# Patient Record
Sex: FEMALE | HISPANIC OR LATINO | Employment: FULL TIME | URBAN - METROPOLITAN AREA
[De-identification: names, ages, dates, MRNs, and addresses within clinical notes are randomized per-mention and may not be internally consistent; named-entity substitution may affect disease eponyms.]

---

## 2018-12-13 ENCOUNTER — HOSPITAL ENCOUNTER (OUTPATIENT)
Dept: RADIOLOGY | Facility: MEDICAL CENTER | Age: 48
End: 2018-12-13
Attending: SPECIALIST
Payer: COMMERCIAL

## 2018-12-13 DIAGNOSIS — C54.1 ENDOMETRIAL CANCER (HCC): ICD-10-CM

## 2018-12-13 PROCEDURE — 71260 CT THORAX DX C+: CPT

## 2018-12-13 PROCEDURE — 700117 HCHG RX CONTRAST REV CODE 255: Performed by: SPECIALIST

## 2018-12-13 RX ADMIN — IOHEXOL 100 ML: 350 INJECTION, SOLUTION INTRAVENOUS at 15:16

## 2018-12-13 RX ADMIN — IOHEXOL 50 ML: 240 INJECTION, SOLUTION INTRATHECAL; INTRAVASCULAR; INTRAVENOUS; ORAL at 15:16

## 2019-02-01 ENCOUNTER — HOSPITAL ENCOUNTER (OUTPATIENT)
Dept: HOSPITAL 8 - RAD | Age: 49
Discharge: HOME | End: 2019-02-01
Attending: RADIOLOGY
Payer: COMMERCIAL

## 2019-02-01 ENCOUNTER — HOSPITAL ENCOUNTER (OUTPATIENT)
Dept: HOSPITAL 8 - ROC | Age: 49
Discharge: HOME | End: 2019-02-01
Attending: RADIOLOGY
Payer: COMMERCIAL

## 2019-02-01 DIAGNOSIS — C54.1: ICD-10-CM

## 2019-02-01 DIAGNOSIS — K76.0: ICD-10-CM

## 2019-02-01 DIAGNOSIS — D73.4: ICD-10-CM

## 2019-02-01 DIAGNOSIS — K57.90: Primary | ICD-10-CM

## 2019-02-01 DIAGNOSIS — Z02.9: Primary | ICD-10-CM

## 2019-02-01 DIAGNOSIS — Z90.710: ICD-10-CM

## 2019-02-01 PROCEDURE — 74177 CT ABD & PELVIS W/CONTRAST: CPT

## 2019-02-01 PROCEDURE — 71260 CT THORAX DX C+: CPT

## 2019-03-04 ENCOUNTER — HOSPITAL ENCOUNTER (OUTPATIENT)
Dept: HOSPITAL 8 - CFH | Age: 49
Discharge: HOME | End: 2019-03-04
Attending: RADIOLOGY
Payer: COMMERCIAL

## 2019-03-04 DIAGNOSIS — C54.1: ICD-10-CM

## 2019-03-04 DIAGNOSIS — N60.02: Primary | ICD-10-CM

## 2019-03-04 DIAGNOSIS — Z15.01: ICD-10-CM

## 2019-03-04 PROCEDURE — 76641 ULTRASOUND BREAST COMPLETE: CPT

## 2019-03-04 PROCEDURE — G0279 TOMOSYNTHESIS, MAMMO: HCPCS

## 2019-03-04 PROCEDURE — 77066 DX MAMMO INCL CAD BI: CPT

## 2019-04-26 ENCOUNTER — HOSPITAL ENCOUNTER (OUTPATIENT)
Dept: HOSPITAL 8 - ROC | Age: 49
Discharge: HOME | End: 2019-04-26
Attending: RADIOLOGY
Payer: COMMERCIAL

## 2019-04-26 DIAGNOSIS — C54.1: Primary | ICD-10-CM

## 2019-04-26 PROCEDURE — G0463 HOSPITAL OUTPT CLINIC VISIT: HCPCS

## 2019-04-26 PROCEDURE — 99212 OFFICE O/P EST SF 10 MIN: CPT

## 2019-07-09 ENCOUNTER — HOSPITAL ENCOUNTER (OUTPATIENT)
Dept: HOSPITAL 8 - INFUSION | Age: 49
Discharge: HOME | End: 2019-07-09
Attending: SPECIALIST
Payer: COMMERCIAL

## 2019-07-09 VITALS — DIASTOLIC BLOOD PRESSURE: 90 MMHG | SYSTOLIC BLOOD PRESSURE: 156 MMHG

## 2019-07-09 VITALS — WEIGHT: 228.62 LBS | BODY MASS INDEX: 39.03 KG/M2 | HEIGHT: 64 IN

## 2019-07-09 DIAGNOSIS — Z51.11: Primary | ICD-10-CM

## 2019-07-09 DIAGNOSIS — C54.1: ICD-10-CM

## 2019-07-09 PROCEDURE — 96413 CHEMO IV INFUSION 1 HR: CPT

## 2019-07-09 PROCEDURE — 96375 TX/PRO/DX INJ NEW DRUG ADDON: CPT

## 2019-07-09 PROCEDURE — 96367 TX/PROPH/DG ADDL SEQ IV INF: CPT

## 2019-07-09 PROCEDURE — 96415 CHEMO IV INFUSION ADDL HR: CPT

## 2019-07-09 PROCEDURE — 96417 CHEMO IV INFUS EACH ADDL SEQ: CPT

## 2019-07-30 ENCOUNTER — HOSPITAL ENCOUNTER (OUTPATIENT)
Dept: HOSPITAL 8 - INFUSION | Age: 49
Discharge: HOME | End: 2019-07-30
Attending: SPECIALIST
Payer: COMMERCIAL

## 2019-07-30 VITALS — HEIGHT: 64 IN | BODY MASS INDEX: 39.03 KG/M2 | WEIGHT: 228.62 LBS

## 2019-07-30 VITALS — DIASTOLIC BLOOD PRESSURE: 82 MMHG | SYSTOLIC BLOOD PRESSURE: 121 MMHG

## 2019-07-30 DIAGNOSIS — Z85.89: ICD-10-CM

## 2019-07-30 DIAGNOSIS — Z51.11: Primary | ICD-10-CM

## 2019-07-30 DIAGNOSIS — E66.01: ICD-10-CM

## 2019-07-30 DIAGNOSIS — C54.1: ICD-10-CM

## 2019-07-30 LAB
<PLATELET ESTIMATE>: (no result)
<PLT MORPHOLOGY>: (no result)
ANISOCYTOSIS BLD QL SMEAR: (no result)
BASOPHILS # BLD AUTO: 0 X10^3/UL (ref 0–0.1)
BASOPHILS NFR BLD AUTO: 0 % (ref 0–1)
EOSINOPHIL # BLD AUTO: 0 X10^3/UL (ref 0–0.4)
EOSINOPHIL NFR BLD AUTO: 0 % (ref 1–7)
ERYTHROCYTE [DISTWIDTH] IN BLOOD BY AUTOMATED COUNT: 25.5 % (ref 9.6–15.2)
LYMPHOCYTES # BLD AUTO: 0.26 X10^3/UL (ref 1–3.4)
LYMPHOCYTES NFR BLD AUTO: 6 % (ref 22–44)
MCH RBC QN AUTO: 29.9 PG (ref 27–34.8)
MCHC RBC AUTO-ENTMCNC: 33.5 G/DL (ref 32.4–35.8)
MCV RBC AUTO: 89.4 FL (ref 80–100)
MD: (no result)
MONOCYTES # BLD AUTO: 0.03 X10^3/UL (ref 0.2–0.8)
MONOCYTES NFR BLD AUTO: 1 % (ref 2–9)
NEUTROPHILS # BLD AUTO: 3.85 X10^3/UL (ref 1.8–6.8)
NEUTROPHILS NFR BLD AUTO: 93 % (ref 42–75)
OVALOCYTES BLD QL SMEAR: (no result)
PLATELET # BLD AUTO: 101 X10^3/UL (ref 130–400)
PMV BLD AUTO: 8.1 FL (ref 7.4–10.4)
POLYCHROMASIA BLD QL SMEAR: (no result)
RBC # BLD AUTO: 3.23 X10^6/UL (ref 3.82–5.3)
TEAR DROPS: (no result)

## 2019-07-30 PROCEDURE — 96375 TX/PRO/DX INJ NEW DRUG ADDON: CPT

## 2019-07-30 PROCEDURE — 85025 COMPLETE CBC W/AUTO DIFF WBC: CPT

## 2019-07-30 PROCEDURE — 96417 CHEMO IV INFUS EACH ADDL SEQ: CPT

## 2019-07-30 PROCEDURE — 96367 TX/PROPH/DG ADDL SEQ IV INF: CPT

## 2019-07-30 PROCEDURE — 36415 COLL VENOUS BLD VENIPUNCTURE: CPT

## 2019-07-30 PROCEDURE — 96415 CHEMO IV INFUSION ADDL HR: CPT

## 2019-07-30 PROCEDURE — 96413 CHEMO IV INFUSION 1 HR: CPT

## 2019-08-20 ENCOUNTER — HOSPITAL ENCOUNTER (OUTPATIENT)
Dept: HOSPITAL 8 - INFUSION | Age: 49
Discharge: HOME | End: 2019-08-20
Attending: SPECIALIST
Payer: COMMERCIAL

## 2019-08-20 VITALS — BODY MASS INDEX: 39.37 KG/M2 | WEIGHT: 233.47 LBS | HEIGHT: 64.5 IN

## 2019-08-20 DIAGNOSIS — C54.1: Primary | ICD-10-CM

## 2019-08-20 PROCEDURE — 96417 CHEMO IV INFUS EACH ADDL SEQ: CPT

## 2019-08-20 PROCEDURE — 36591 DRAW BLOOD OFF VENOUS DEVICE: CPT

## 2019-08-20 PROCEDURE — 96368 THER/DIAG CONCURRENT INF: CPT

## 2019-08-20 PROCEDURE — 86304 IMMUNOASSAY TUMOR CA 125: CPT

## 2019-08-20 PROCEDURE — G0463 HOSPITAL OUTPT CLINIC VISIT: HCPCS

## 2019-08-20 PROCEDURE — 83735 ASSAY OF MAGNESIUM: CPT

## 2019-08-20 PROCEDURE — 96415 CHEMO IV INFUSION ADDL HR: CPT

## 2019-08-20 PROCEDURE — 99212 OFFICE O/P EST SF 10 MIN: CPT

## 2019-08-20 PROCEDURE — 96375 TX/PRO/DX INJ NEW DRUG ADDON: CPT

## 2019-08-20 PROCEDURE — 80053 COMPREHEN METABOLIC PANEL: CPT

## 2019-08-20 PROCEDURE — 96413 CHEMO IV INFUSION 1 HR: CPT

## 2019-08-20 PROCEDURE — 85025 COMPLETE CBC W/AUTO DIFF WBC: CPT

## 2019-08-20 PROCEDURE — 96367 TX/PROPH/DG ADDL SEQ IV INF: CPT

## 2019-08-20 PROCEDURE — 36415 COLL VENOUS BLD VENIPUNCTURE: CPT

## 2019-09-17 ENCOUNTER — HOSPITAL ENCOUNTER (OUTPATIENT)
Dept: RADIOLOGY | Facility: MEDICAL CENTER | Age: 49
End: 2019-09-17
Attending: NURSE PRACTITIONER
Payer: COMMERCIAL

## 2019-09-17 DIAGNOSIS — C54.1 MALIGNANT NEOPLASM OF ENDOMETRIUM (HCC): ICD-10-CM

## 2019-09-17 PROCEDURE — 74177 CT ABD & PELVIS W/CONTRAST: CPT

## 2019-09-17 PROCEDURE — 700117 HCHG RX CONTRAST REV CODE 255: Performed by: NURSE PRACTITIONER

## 2019-09-17 RX ADMIN — IOHEXOL 25 ML: 240 INJECTION, SOLUTION INTRATHECAL; INTRAVASCULAR; INTRAVENOUS; ORAL at 09:57

## 2019-09-17 RX ADMIN — IOHEXOL 100 ML: 350 INJECTION, SOLUTION INTRAVENOUS at 10:13

## 2019-10-07 NOTE — PROGRESS NOTES
"  Non face to face prolonged services of clinical data and chart information reviewed for a total time of 30 performed on 10/7 for Mandi Floresoza    Reviewed were:    Referral    Previous encounters    Medications    Imaging    Previous procedures    Other Orders    Letters    Notes    Media    Miscellaneous reports    Patient summaries        Counseling, testing information and materials prepared    \"I spent 30 minutes  from 0630 to 0700 reviewing past records, prior to visit pertaining to genetic risk.\"     Gera Kinsey M.D.  edited  "

## 2019-10-08 ENCOUNTER — OFFICE VISIT (OUTPATIENT)
Dept: HEMATOLOGY ONCOLOGY | Facility: MEDICAL CENTER | Age: 49
End: 2019-10-08
Payer: COMMERCIAL

## 2019-10-08 VITALS
SYSTOLIC BLOOD PRESSURE: 126 MMHG | WEIGHT: 235.56 LBS | DIASTOLIC BLOOD PRESSURE: 74 MMHG | TEMPERATURE: 97.7 F | BODY MASS INDEX: 40.22 KG/M2 | OXYGEN SATURATION: 98 % | RESPIRATION RATE: 14 BRPM | HEART RATE: 74 BPM | HEIGHT: 64 IN

## 2019-10-08 DIAGNOSIS — Z15.01 BRCA1 GENE MUTATION POSITIVE IN FEMALE: ICD-10-CM

## 2019-10-08 DIAGNOSIS — Z15.09 BRCA1 GENE MUTATION POSITIVE IN FEMALE: ICD-10-CM

## 2019-10-08 DIAGNOSIS — C54.1 ENDOMETRIAL CANCER (HCC): ICD-10-CM

## 2019-10-08 DIAGNOSIS — Z15.02 BRCA1 GENE MUTATION POSITIVE IN FEMALE: ICD-10-CM

## 2019-10-08 PROCEDURE — 99358 PROLONG SERVICE W/O CONTACT: CPT | Performed by: MEDICAL GENETICS

## 2019-10-08 PROCEDURE — 99203 OFFICE O/P NEW LOW 30 MIN: CPT | Performed by: MEDICAL GENETICS

## 2019-10-08 NOTE — PROGRESS NOTES
Patient referred for evaluation, counseling and management suggestions  The patient was diagnosed with endometrial cancer ~ 1 year ago and received genetic testing by Dr Olsen.  The patient was found to have a pathogenic mutation in the BRCA1 gene  ([id number]).  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    The BRCA1 pathogenic variant provides a 40-87% lifetime risk of breast cancer (in women) and 16-59% lifetime risk of ovarian, fallopian tube or peritoneal cancer in women  Males with the same BRCA1 variant have 1-2% lifetime risk of male breast cancer and 20% lifetime risk of prostate cancer  Both males and females with this BRCA1 variant have 1-3% lifetime risk of pancreatic cancers.    The patient's personal and family histories were reviewed in detail.  The patient has no other cancer diagnosis and, by report, has had an unremarkable mammogram recently    She has met with Dr Abad and has had a discussion of the value of risk reducing bilateral mastectomy  She is to be followed by Dr Abad with twice yearly alternating mammograms and MRI    She was surgically treated for her endometrial cancer by Dr Buckner who followed her with 6 months of a chemo regimen of unknown type.    A full and detailed pedigree was constructed (no Ashkenazic Mu-ism background).  The patient has 3 sons (no daughters), no grandchildren  The patient has 11 siblings (one brother : colon cancer); 7 living brothers and 3 sisters  One of the sisters carries a myeloma diagnosis  The son of a niece carries a leukemia diagnosis.    The remainder of the family history is limited due to little information    It was recommended that each of the patient's children be tested (the youngest at age 18-21) before reproduction.  It was also recommended that each sibling of the patient, with a 1 in 2 risk of BRCA1 carriage, should be tested  Testing nieces and nephews should be done based on the results of the patient's sibling test  results.    Also discussed was preimplantation genetic diagnosis and its utility in reproduction for this family     For family members who test positive: risk reducing surgical procedures and chemoprevention should be discussed    All questions answered    A total of 30 minutes of face to face discussion took place with >50% of which involving data gathering and management planning.

## 2019-10-11 ENCOUNTER — HOSPITAL ENCOUNTER (OUTPATIENT)
Dept: RADIOLOGY | Facility: MEDICAL CENTER | Age: 49
End: 2019-10-11
Attending: SPECIALIST
Payer: COMMERCIAL

## 2019-10-11 DIAGNOSIS — C54.1 ENDOMETRIAL NEOPLASM MALIGNANT (HCC): ICD-10-CM

## 2019-10-11 PROCEDURE — 76705 ECHO EXAM OF ABDOMEN: CPT

## 2019-12-06 ENCOUNTER — HOSPITAL ENCOUNTER (OUTPATIENT)
Dept: RADIOLOGY | Facility: MEDICAL CENTER | Age: 49
End: 2019-12-06
Attending: SPECIALIST
Payer: COMMERCIAL

## 2019-12-06 DIAGNOSIS — C54.1 ENDOMETRIAL SARCOMA (HCC): ICD-10-CM

## 2019-12-06 DIAGNOSIS — C55 MALIGNANT NEOPLASM OF UTERUS, UNSPECIFIED SITE (HCC): ICD-10-CM

## 2019-12-06 PROCEDURE — 700117 HCHG RX CONTRAST REV CODE 255: Performed by: SPECIALIST

## 2019-12-06 PROCEDURE — 71260 CT THORAX DX C+: CPT

## 2019-12-06 RX ADMIN — IOHEXOL 100 ML: 350 INJECTION, SOLUTION INTRAVENOUS at 11:15

## 2019-12-06 RX ADMIN — IOHEXOL 25 ML: 240 INJECTION, SOLUTION INTRATHECAL; INTRAVASCULAR; INTRAVENOUS; ORAL at 11:15

## 2020-03-05 ENCOUNTER — HOSPITAL ENCOUNTER (OUTPATIENT)
Dept: HOSPITAL 8 - CFH | Age: 50
Discharge: HOME | End: 2020-03-05
Attending: SURGERY
Payer: COMMERCIAL

## 2020-03-05 DIAGNOSIS — Z15.01: ICD-10-CM

## 2020-03-05 DIAGNOSIS — C54.1: ICD-10-CM

## 2020-03-05 DIAGNOSIS — Z12.31: Primary | ICD-10-CM

## 2020-03-05 PROCEDURE — 77067 SCR MAMMO BI INCL CAD: CPT

## 2020-03-05 PROCEDURE — 77063 BREAST TOMOSYNTHESIS BI: CPT

## 2020-06-19 ENCOUNTER — HOSPITAL ENCOUNTER (OUTPATIENT)
Dept: RADIOLOGY | Facility: MEDICAL CENTER | Age: 50
End: 2020-06-19
Attending: NURSE PRACTITIONER
Payer: COMMERCIAL

## 2020-06-19 DIAGNOSIS — C77.9 MALIGNANT NEOPLASM METASTATIC TO LYMPH NODES, UNSPECIFIED LYMPH NODE REGION (HCC): ICD-10-CM

## 2020-06-19 DIAGNOSIS — C54.1 ENDOMETRIAL SARCOMA (HCC): ICD-10-CM

## 2020-06-19 DIAGNOSIS — Z85.42 PERSONAL HISTORY OF MALIGNANT NEOPLASM OF OTHER PARTS OF UTERUS: ICD-10-CM

## 2020-06-19 PROCEDURE — 74177 CT ABD & PELVIS W/CONTRAST: CPT

## 2020-06-19 PROCEDURE — 700117 HCHG RX CONTRAST REV CODE 255: Performed by: NURSE PRACTITIONER

## 2020-06-19 RX ADMIN — IOHEXOL 25 ML: 240 INJECTION, SOLUTION INTRATHECAL; INTRAVASCULAR; INTRAVENOUS; ORAL at 12:15

## 2020-06-19 RX ADMIN — IOHEXOL 100 ML: 350 INJECTION, SOLUTION INTRAVENOUS at 12:15

## 2021-02-03 ENCOUNTER — HOSPITAL ENCOUNTER (OUTPATIENT)
Dept: HOSPITAL 8 - CFH | Age: 51
Discharge: HOME | End: 2021-02-03
Attending: SURGERY
Payer: COMMERCIAL

## 2021-02-03 DIAGNOSIS — Z15.01: ICD-10-CM

## 2021-02-03 DIAGNOSIS — N60.02: Primary | ICD-10-CM

## 2021-02-03 DIAGNOSIS — Z80.3: ICD-10-CM

## 2021-02-03 DIAGNOSIS — N60.01: ICD-10-CM

## 2021-02-03 PROCEDURE — A9575 INJ GADOTERATE MEGLUMI 0.1ML: HCPCS

## 2021-02-03 PROCEDURE — C8908 MRI W/O FOL W/CONT, BREAST,: HCPCS

## 2021-02-03 PROCEDURE — C8937 CAD BREAST MRI: HCPCS

## 2021-02-03 PROCEDURE — 77049 MRI BREAST C-+ W/CAD BI: CPT

## 2021-03-15 ENCOUNTER — HOSPITAL ENCOUNTER (OUTPATIENT)
Dept: LAB | Facility: MEDICAL CENTER | Age: 51
End: 2021-03-15
Attending: INTERNAL MEDICINE
Payer: COMMERCIAL

## 2021-03-15 ENCOUNTER — OFFICE VISIT (OUTPATIENT)
Dept: HEALTH INFORMATION MANAGEMENT | Facility: MEDICAL CENTER | Age: 51
End: 2021-03-15
Payer: COMMERCIAL

## 2021-03-15 VITALS
SYSTOLIC BLOOD PRESSURE: 126 MMHG | WEIGHT: 254.7 LBS | DIASTOLIC BLOOD PRESSURE: 82 MMHG | HEIGHT: 65 IN | OXYGEN SATURATION: 94 % | BODY MASS INDEX: 42.44 KG/M2 | HEART RATE: 71 BPM

## 2021-03-15 DIAGNOSIS — G47.9 SLEEPING DIFFICULTY: ICD-10-CM

## 2021-03-15 DIAGNOSIS — E78.00 HYPERCHOLESTEROLEMIA: ICD-10-CM

## 2021-03-15 DIAGNOSIS — Z15.01 BRCA1 POSITIVE: ICD-10-CM

## 2021-03-15 DIAGNOSIS — E55.9 VITAMIN D DEFICIENCY: ICD-10-CM

## 2021-03-15 DIAGNOSIS — R63.5 WEIGHT GAIN: ICD-10-CM

## 2021-03-15 DIAGNOSIS — E66.01 MORBID (SEVERE) OBESITY DUE TO EXCESS CALORIES (HCC): ICD-10-CM

## 2021-03-15 DIAGNOSIS — Z15.09 BRCA1 POSITIVE: ICD-10-CM

## 2021-03-15 DIAGNOSIS — E66.01 OBESITY, CLASS III, BMI 40-49.9 (MORBID OBESITY) (HCC): ICD-10-CM

## 2021-03-15 DIAGNOSIS — Z85.42 HISTORY OF ENDOMETRIAL CANCER: ICD-10-CM

## 2021-03-15 DIAGNOSIS — R73.01 IMPAIRED FASTING GLUCOSE: ICD-10-CM

## 2021-03-15 DIAGNOSIS — R63.2 BINGE EATING: ICD-10-CM

## 2021-03-15 LAB
ALBUMIN SERPL BCP-MCNC: 4.3 G/DL (ref 3.2–4.9)
ALBUMIN/GLOB SERPL: 1.3 G/DL
ALP SERPL-CCNC: 72 U/L (ref 30–99)
ALT SERPL-CCNC: 22 U/L (ref 2–50)
ANION GAP SERPL CALC-SCNC: 10 MMOL/L (ref 7–16)
AST SERPL-CCNC: 19 U/L (ref 12–45)
BILIRUB SERPL-MCNC: 0.6 MG/DL (ref 0.1–1.5)
BUN SERPL-MCNC: 15 MG/DL (ref 8–22)
CALCIUM SERPL-MCNC: 9.7 MG/DL (ref 8.4–10.2)
CHLORIDE SERPL-SCNC: 102 MMOL/L (ref 96–112)
CHOLEST SERPL-MCNC: 190 MG/DL (ref 100–199)
CO2 SERPL-SCNC: 26 MMOL/L (ref 20–33)
CREAT SERPL-MCNC: 0.72 MG/DL (ref 0.5–1.4)
ERYTHROCYTE [DISTWIDTH] IN BLOOD BY AUTOMATED COUNT: 41.7 FL (ref 35.9–50)
EST. AVERAGE GLUCOSE BLD GHB EST-MCNC: 100 MG/DL
GLOBULIN SER CALC-MCNC: 3.4 G/DL (ref 1.9–3.5)
GLUCOSE SERPL-MCNC: 96 MG/DL (ref 65–99)
HBA1C MFR BLD: 5.1 % (ref 4–5.6)
HCT VFR BLD AUTO: 41.6 % (ref 37–47)
HDLC SERPL-MCNC: 56 MG/DL
HGB BLD-MCNC: 14 G/DL (ref 12–16)
LDLC SERPL CALC-MCNC: 109 MG/DL
MCH RBC QN AUTO: 28 PG (ref 27–33)
MCHC RBC AUTO-ENTMCNC: 33.7 G/DL (ref 33.6–35)
MCV RBC AUTO: 83.2 FL (ref 81.4–97.8)
PLATELET # BLD AUTO: 163 K/UL (ref 164–446)
PMV BLD AUTO: 9.9 FL (ref 9–12.9)
POTASSIUM SERPL-SCNC: 4.1 MMOL/L (ref 3.6–5.5)
PROT SERPL-MCNC: 7.7 G/DL (ref 6–8.2)
RBC # BLD AUTO: 5 M/UL (ref 4.2–5.4)
SODIUM SERPL-SCNC: 138 MMOL/L (ref 135–145)
TRIGL SERPL-MCNC: 126 MG/DL (ref 0–149)
TSH SERPL DL<=0.005 MIU/L-ACNC: 1.67 UIU/ML (ref 0.38–5.33)
WBC # BLD AUTO: 5.2 K/UL (ref 4.8–10.8)

## 2021-03-15 PROCEDURE — 93000 ELECTROCARDIOGRAM COMPLETE: CPT | Performed by: INTERNAL MEDICINE

## 2021-03-15 PROCEDURE — 82306 VITAMIN D 25 HYDROXY: CPT

## 2021-03-15 PROCEDURE — 80053 COMPREHEN METABOLIC PANEL: CPT

## 2021-03-15 PROCEDURE — 36415 COLL VENOUS BLD VENIPUNCTURE: CPT

## 2021-03-15 PROCEDURE — 84443 ASSAY THYROID STIM HORMONE: CPT

## 2021-03-15 PROCEDURE — 80061 LIPID PANEL: CPT

## 2021-03-15 PROCEDURE — 97802 MEDICAL NUTRITION INDIV IN: CPT | Performed by: DIETITIAN, REGISTERED

## 2021-03-15 PROCEDURE — 99205 OFFICE O/P NEW HI 60 MIN: CPT | Performed by: INTERNAL MEDICINE

## 2021-03-15 PROCEDURE — 83036 HEMOGLOBIN GLYCOSYLATED A1C: CPT

## 2021-03-15 PROCEDURE — 85027 COMPLETE CBC AUTOMATED: CPT

## 2021-03-15 ASSESSMENT — PATIENT HEALTH QUESTIONNAIRE - PHQ9
SUM OF ALL RESPONSES TO PHQ QUESTIONS 1-9: 7
CLINICAL INTERPRETATION OF PHQ2 SCORE: 1
5. POOR APPETITE OR OVEREATING: 2 - MORE THAN HALF THE DAYS
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
## 2021-03-15 NOTE — PROGRESS NOTES
"3/15/2021     Mandi Reyes 50 y.o.        Time in/out: 1:18-1:54 PM    Anthropometrics/Objective  Vitals:    03/15/21 1042   BP: 126/82   Weight: 116 kg (254 lb 11.2 oz)   Height: 1.651 m (5' 5\")     BMI: Body mass index is 42.38 kg/m².  Stated Goal Weight: see MD note  See comprehensive patient history form for further information     Subjective:  Pt is here today for the initial screening visit for the medical weight management program.   - pt planning to start VLCD  - no previous nutrition education  - has been part of the WW program in the past  - usually eats lunch and dinner meals, skips breakfast    See HIP Medical Questionnaire in media for more detailed diet history     Nutrition Diagnosis (PES Statement)  · Obesity related to excessive energy intake and inadequate energy expenditure as evidenced by BMI >30     Client history:  Condition(s) associated with a diagnosis or treatment / Pertinent Medical Hx: weight gain, vitamin D deficiency, hypercholesterolemia, hx of endometrial cancer, BRCA1 positive, impaired fasting glucose, binge eating, sleeping difficulty, hx of heart murmur, hx of heart valve disease    Biochemical data, medical test and procedures  Lab Results   Component Value Date/Time    HBA1C 5.5 09/23/2020 08:13 AM     No results found for: POCGLUCOSE  No results found for: CHOLSTRLTOT, LDL, HDL, TRIGLYCERIDE      Nutrition Intervention  Nutrition Prescription  Recommended Daily Kcals: 9594-7375 Kcal based on REE of 1784   Recommended Daily Protein: 100g or ~30% of kcals      Meal Plan Recommendation   VLCD with 4 ND MR's per day, 0 snacks    Comprehensive Nutrition Education Instruction or training leading to in-depth nutrition related knowledge about:   - VLCD Meal Plan              - VLCD emergency food plan              - Patient Information while on VLCD    Monitoring & Evaluation Plan    Behavioral-Environmental:  Physical activity: Did not discuss today.     Food / Nutrient " Intake:  Food intake: Follow meal plan as discussed.    Fluid intake: Consume at least 64 oz water per day. Avoid all sweetened beverages. Limit coffee to 1 cup a day (ideally no cream or sugar). Limit or avoid alcohol as discussed with Dr. Hernandez.     Physical Signs / Symptoms:  Weight loss towards BMI < 30   Weight change -3-5 lbs per week    Assessment Notes:  Today I oriented Mandi to Dr. Butt's Medical Weight Management program. Reviewed VLCD diet plan, patient is doing 4 meals replacement shakes per day. Tracey will be on this plan for ~3 months. After ~3 months, will reevaluate plan to add food meals. Reviewed shake meal timing. At this visit we went over VLCD protocol with product options, emergency plan, and potential side effects. Did not review any of the general program guidelines at this time. Will need to be discussed at future visits.     F/U: 4-6 weeks CHRISTOS/MD

## 2021-03-16 LAB — 25(OH)D3 SERPL-MCNC: 25 NG/ML (ref 30–100)

## 2021-03-29 ENCOUNTER — TELEMEDICINE (OUTPATIENT)
Dept: HEALTH INFORMATION MANAGEMENT | Facility: MEDICAL CENTER | Age: 51
End: 2021-03-29
Payer: COMMERCIAL

## 2021-03-29 DIAGNOSIS — R63.2 BINGE EATING: ICD-10-CM

## 2021-03-29 DIAGNOSIS — R63.5 WEIGHT GAIN: ICD-10-CM

## 2021-03-29 DIAGNOSIS — E55.9 VITAMIN D DEFICIENCY: ICD-10-CM

## 2021-03-29 DIAGNOSIS — R73.01 IMPAIRED FASTING GLUCOSE: ICD-10-CM

## 2021-03-29 DIAGNOSIS — G47.9 SLEEPING DIFFICULTY: ICD-10-CM

## 2021-03-29 DIAGNOSIS — E78.00 HYPERCHOLESTEROLEMIA: ICD-10-CM

## 2021-03-29 DIAGNOSIS — Z15.09 BRCA1 POSITIVE: ICD-10-CM

## 2021-03-29 DIAGNOSIS — Z15.01 BRCA1 POSITIVE: ICD-10-CM

## 2021-03-29 DIAGNOSIS — E66.01 OBESITY, CLASS III, BMI 40-49.9 (MORBID OBESITY) (HCC): ICD-10-CM

## 2021-03-29 PROCEDURE — 99213 OFFICE O/P EST LOW 20 MIN: CPT | Mod: 95,CR | Performed by: INTERNAL MEDICINE

## 2021-03-29 NOTE — PROGRESS NOTES
This evaluation was conducted via Zoom using secure and encrypted videoconferencing technology. The patient was in a private location in the state [place].    The patient's identity was confirmed and verbal consent was obtained for this virtual visit.      Bariatric Medicine Follow Up  Chief Complaint   Patient presents with   • Weight Gain       History of Present Illness:   Mandi Reyes is a 50 y.o. female who presents for follow-up to intensive behavioral modification of overweight and to help address below co-morbidities caused by overweight.      Weight Management History to Date:  3/15/2021 visit #1:  Weight loss to prevent breast cancer recurrence  VLCD x4    ___    Challenges since last visit:  few  Dietary adherence:  good  VLCD x 4  Having some raw vegetables  H/a day 2 now resolved  Exercise:  Started walking program x 40 min daily    AntiObesity Medication use: none  Medication efficacy/tolerance/side effects: n/a  Medication compliance: n/a    Status of comorbid conditions/other medical diagnoses:  IFG: Controlled with metformin  Insomnia: Controlled  BRCA1 positive: Pending mastectomy  HLD: Controlled, not on statin or fenofibrate  VDD: Controlled, not on supplement       Physical Exam:    There were no vitals taken for this visit.      Last weight: 255 pounds  Current weight:  245 lb  Weight change since last visit:  -10 lb     Physical findings unchanged from prior exam.    Laboratory:   Recent labs reviewed.     ASSESSMENT  50 y.o.  female presents for intensive lifestyle intervention for treatment of obesity and co-morbid conditions.  Obesity Stage ([place])/Class: 3/3    1. Weight gain     2. Impaired fasting glucose     3. Binge eating     4. Sleeping difficulty     5. BRCA1 positive     6. Hypercholesterolemia     7. Vitamin D deficiency     8. Obesity, Class III, BMI 40-49.9 (morbid obesity) (HCC)         The patient has begun to make some positive changes, using intensive  stimulus narrowing via VLCD.  Recommend continuing same to control binge eating.  Monitor fasting glucose, lipids, sleep, vitamin D.    PLAN  Weight Goal: Under 200 lb fat mass percent less than 30%  Dietary intervention:    MR: VLCD x4  Plus raw vegetables snack  64+ oz water per day  Avoid snacking on refined carbs  Physical Activity:  Walking 40 min daily  Labs: N/A  Rx changes:   Consider DC HCTZ   Behavior modification:   Continue intensive stimulus narrowing  Surgical considerations: Mastectomy planned after weight loss  Follow-up: 2 wks with MD  See also recent RD notes and follow-up.      Patient's body mass index is unknown because there is no height or weight on file. Exercise and nutrition counseling were performed at this visit.

## 2021-04-13 ENCOUNTER — OFFICE VISIT (OUTPATIENT)
Dept: HEALTH INFORMATION MANAGEMENT | Facility: MEDICAL CENTER | Age: 51
End: 2021-04-13
Payer: COMMERCIAL

## 2021-04-13 VITALS
DIASTOLIC BLOOD PRESSURE: 84 MMHG | WEIGHT: 239.1 LBS | OXYGEN SATURATION: 93 % | HEART RATE: 77 BPM | SYSTOLIC BLOOD PRESSURE: 126 MMHG | HEIGHT: 66 IN | BODY MASS INDEX: 38.42 KG/M2

## 2021-04-13 DIAGNOSIS — E78.00 HYPERCHOLESTEROLEMIA: ICD-10-CM

## 2021-04-13 DIAGNOSIS — R73.01 IMPAIRED FASTING GLUCOSE: ICD-10-CM

## 2021-04-13 DIAGNOSIS — E66.01 MORBID (SEVERE) OBESITY DUE TO EXCESS CALORIES (HCC): ICD-10-CM

## 2021-04-13 DIAGNOSIS — R63.2 BINGE EATING: ICD-10-CM

## 2021-04-13 DIAGNOSIS — E55.9 VITAMIN D DEFICIENCY: ICD-10-CM

## 2021-04-13 DIAGNOSIS — R63.5 WEIGHT GAIN: ICD-10-CM

## 2021-04-13 DIAGNOSIS — Z15.09 BRCA1 POSITIVE: ICD-10-CM

## 2021-04-13 DIAGNOSIS — E66.01 OBESITY, CLASS III, BMI 40-49.9 (MORBID OBESITY) (HCC): ICD-10-CM

## 2021-04-13 DIAGNOSIS — Z15.01 BRCA1 POSITIVE: ICD-10-CM

## 2021-04-13 PROCEDURE — 99213 OFFICE O/P EST LOW 20 MIN: CPT | Performed by: INTERNAL MEDICINE

## 2021-04-13 PROCEDURE — 97803 MED NUTRITION INDIV SUBSEQ: CPT | Performed by: DIETITIAN, REGISTERED

## 2021-04-13 ASSESSMENT — PATIENT HEALTH QUESTIONNAIRE - PHQ9: CLINICAL INTERPRETATION OF PHQ2 SCORE: 0

## 2021-04-13 ASSESSMENT — FIBROSIS 4 INDEX: FIB4 SCORE: 1.27

## 2021-04-13 NOTE — PROGRESS NOTES
"Nutrition Reassess: Medical Weight Management   Today's date: 4/13/2021  Referring Provider: No ref. provider found      Time: in/out 2:46-3:15  Visit#: 2    Subjective:  - VLCD  - pleased with progress  - some NS veggies as snscks  - would like more variation   - some constipation         Anthropometrics/Objective  Vitals 4/13/2021   WEIGHT 239.1   HEIGHT 5' 5.5\"   BMI 39.18 kg/m2     Starting weight/date 254.7 lbs     Total weight change : -15lb            Meal Plan Recommendation   VLCD with 4 ND MR's per day, some NS veggie snacks    ReAssesment/Notes:    Mandi has been working on her overall health goals inclusive of weight loss. She has been doing well with VLCD. Finds progress motivating to continue with current plan. She has been having soms NS veggies (cucumber, carrots, jicama) as snacks when she misses the chewing aspect of eating. We discussed the MR bars an option at this time as well as more NS veggies as she is sometime constipated. We discussed psyllium husk as a helpful tool as well. Encouraged Mandi to reach out with any questions.     Follow-up: 2 weeks MD, 4 weeks RD    "

## 2021-04-13 NOTE — PROGRESS NOTES
"Bariatric Medicine Follow Up  Chief Complaint   Patient presents with   • Weight Gain       History of Present Illness:   Mandi Reyes is a 50 y.o. female who presents for follow-up to intensive behavioral modification of overweight and to help address below co-morbidities caused by overweight.      Weight Management History to Date:  3/29/2021 visit #2:  VLCD x4  Walking 40 minutes daily  Considering discontinue HCTZ      3/15/2021 visit #1:  Weight loss to prevent breast cancer recurrence  VLCD x4  ___    Challenges since last visit:  Few  Much more am energy  Dietary adherence:  Very good  VLCD x 4 per day  Some days only having 3 MR daily  Exercise:  Still walking 40 min/d    AntiObesity Medication use: none  Medication efficacy/tolerance/side effects: n/a  Medication compliance: n/a    Status of comorbid conditions/other medical diagnoses:  IFG: Controlled on Metformin  BRCA1 positive:  Pending mastectomy after wt loss  HLD: LDL mildly elevated on most recent labs not on statin or fenofibrate  VDD: Uncontrolled on most recent labs, just started vitamin D supplement       Physical Exam:    /84 (BP Location: Left arm, Patient Position: Sitting, BP Cuff Size: Large adult)   Pulse 77   Ht 1.664 m (5' 5.5\")   Wt 108 kg (239 lb 1.6 oz)   SpO2 93%   BMI 39.18 kg/m²   Waist Measurement   Waist: 42.5 inch/inches  Weight change since last visit: -5 lb (-15.6 lb total)  Waist Circum change since last visit: -0.5 in (-1 in total)    Physical findings unchanged from prior exam.    Laboratory:   Recent labs reviewed.     ASSESSMENT  50 y.o.  female presents for intensive lifestyle intervention for treatment of obesity and co-morbid conditions.  Obesity Stage ([place])/Class: 3/3    1. Weight gain     2. Obesity, Class III, BMI 40-49.9 (morbid obesity) (HCC)     3. BRCA1 positive     4. Impaired fasting glucose     5. Binge eating     6. Hypercholesterolemia     7. Vitamin D deficiency         Patient " is doing well.  She is reversing her previous weight gain with VLCD so recommend continuing same.  Continue to monitor fasting glucose, lipids, vitamin D.  Starting vitamin D supplement.  Fat mass percent has improved significantly without loss in muscle mass.    She is right on target with her weight loss goals.    PLAN  Weight Goal: Under 200 lb  Fat mass percent less than 30%  Dietary intervention:    MR: VLCD x4  Occasional lean green meal in place of 1 ND MR  64+ oz water per day  Avoid undereating  Physical Activity: Walking 40 minutes daily  Labs: N/A  Rx changes:   None  Behavior modification:   Continue intensive stimulus narrowing  Surgical considerations: Mastectomy planned after weight loss  Follow-up: one month with MD via telemed  See also recent RD notes and follow-up.      Patient's body mass index is 39.18 kg/m². Exercise and nutrition counseling were performed at this visit.

## 2021-04-27 ENCOUNTER — TELEMEDICINE (OUTPATIENT)
Dept: HEALTH INFORMATION MANAGEMENT | Facility: MEDICAL CENTER | Age: 51
End: 2021-04-27
Payer: COMMERCIAL

## 2021-04-27 VITALS — WEIGHT: 231.4 LBS | BODY MASS INDEX: 37.92 KG/M2

## 2021-04-27 DIAGNOSIS — R63.5 WEIGHT GAIN: ICD-10-CM

## 2021-04-27 DIAGNOSIS — E55.9 VITAMIN D DEFICIENCY: ICD-10-CM

## 2021-04-27 DIAGNOSIS — R63.2 BINGE EATING: ICD-10-CM

## 2021-04-27 DIAGNOSIS — Z15.01 BRCA1 POSITIVE: ICD-10-CM

## 2021-04-27 DIAGNOSIS — E66.01 OBESITY, CLASS III, BMI 40-49.9 (MORBID OBESITY) (HCC): ICD-10-CM

## 2021-04-27 DIAGNOSIS — R73.01 IMPAIRED FASTING GLUCOSE: ICD-10-CM

## 2021-04-27 DIAGNOSIS — Z15.09 BRCA1 POSITIVE: ICD-10-CM

## 2021-04-27 PROCEDURE — 99213 OFFICE O/P EST LOW 20 MIN: CPT | Mod: 95,CR | Performed by: INTERNAL MEDICINE

## 2021-04-27 ASSESSMENT — FIBROSIS 4 INDEX: FIB4 SCORE: 1.27

## 2021-04-27 NOTE — PROGRESS NOTES
This evaluation was conducted via [employer] using secure and encrypted videoconferencing technology. The patient was in a private location in the [facility].    The patient's identity was confirmed and verbal consent was obtained for this virtual visit.      Bariatric Medicine Follow Up  Chief Complaint   Patient presents with   • Weight Gain       History of Present Illness:   Mandi Reyes is a 50 y.o. female who presents for follow-up to intensive behavioral modification of overweight and to help address below co-morbidities caused by overweight.      Weight Management History to Date:  4/13/2021 visit #3:  VLCD x 4  More walking    3/29/2021 visit #2:  VLCD x4  Walking 40 minutes daily  Considering discontinue HCTZ     3/15/2021 visit #1:  Weight loss to prevent breast cancer recurrence  VLCD x4  ___    Challenges since last visit:  Few  Overall feels great, not feeling hungry  Dietary adherence:  Good  Continues VLCD x 4 per day  Having raw veg for snacks gallo when cooking dinner for others  Difficult to not eat dinner with family  Occasional lean/green meal  Wants to continue same  Drinking more water  Exercise:  Walking 3 mi daily    AntiObesity Medication use: none  Medication efficacy/tolerance/side effects: n/a  Medication compliance: n/a    Status of comorbid conditions/other medical diagnoses:  BRCA1: Continues to work on reduction in fat mass percent  IFG: Controlled, on metformin  VDD: Controlled on weekly vitamin D       Physical Exam:    Wt 105 kg (231 lb 6.4 oz)   BMI 37.92 kg/m²       Last wt:  239 lb  Average 2 lb/wk  Current wt:  231 lb   Weight change since last visit:  -8 lb (-23.6 lb total)    Physical findings unchanged from prior exam.    Laboratory:   Recent labs reviewed.     ASSESSMENT  50 y.o.  female presents for intensive lifestyle intervention for treatment of obesity and co-morbid conditions.  Obesity Stage ([place])/Class: 3/3    1. Weight gain     2. BRCA1 positive      3. Impaired fasting glucose     4. Binge eating     5. Vitamin D deficiency     6. Obesity, Class III, BMI 40-49.9 (morbid obesity) (HCC)         Mandi continues to do well, responding to intensive stimulus narrowing with VLCD.  Binge eating under better control, reducing obesity class and reversing weight gain gradually.  Continue to monitor fasting glucose, vitamin D.  Continue higher activity level.  Begin to introduce more whole food next visit per patient request.    PLAN  Weight Goal:  <200 lb  Dietary intervention:    MR:  VLCD x 4  Lean/green meal 2/wk  NSV for snack once daily  64+ oz water per day  Wants to increase whole food next visit  Physical Activity: Walking 3 to 4 miles daily  Labs: N/A  Rx changes:   Wants to consider phentermine next visit  Behavior modification:   Maintain more intensive stimulus narrowing  Surgical considerations: N/A  Follow-up: one month with MD  See also recent RD notes and follow-up.      Patient's body mass index is 37.92 kg/m². Exercise and nutrition counseling were performed at this visit.

## 2021-05-13 ENCOUNTER — OFFICE VISIT (OUTPATIENT)
Dept: HEALTH INFORMATION MANAGEMENT | Facility: MEDICAL CENTER | Age: 51
End: 2021-05-13
Payer: COMMERCIAL

## 2021-05-13 VITALS
OXYGEN SATURATION: 94 % | BODY MASS INDEX: 37 KG/M2 | DIASTOLIC BLOOD PRESSURE: 78 MMHG | WEIGHT: 230.2 LBS | SYSTOLIC BLOOD PRESSURE: 122 MMHG | HEIGHT: 66 IN | HEART RATE: 74 BPM

## 2021-05-13 DIAGNOSIS — E78.00 HYPERCHOLESTEROLEMIA: ICD-10-CM

## 2021-05-13 DIAGNOSIS — E55.9 VITAMIN D DEFICIENCY: ICD-10-CM

## 2021-05-13 DIAGNOSIS — R73.01 IMPAIRED FASTING GLUCOSE: ICD-10-CM

## 2021-05-13 DIAGNOSIS — Z85.42 HISTORY OF ENDOMETRIAL CANCER: ICD-10-CM

## 2021-05-13 DIAGNOSIS — E66.01 OBESITY, CLASS III, BMI 40-49.9 (MORBID OBESITY) (HCC): ICD-10-CM

## 2021-05-13 DIAGNOSIS — R63.5 WEIGHT GAIN: ICD-10-CM

## 2021-05-13 DIAGNOSIS — R63.2 BINGE EATING: ICD-10-CM

## 2021-05-13 DIAGNOSIS — G47.9 SLEEPING DIFFICULTY: ICD-10-CM

## 2021-05-13 PROCEDURE — 99214 OFFICE O/P EST MOD 30 MIN: CPT | Performed by: INTERNAL MEDICINE

## 2021-05-13 PROCEDURE — 97803 MED NUTRITION INDIV SUBSEQ: CPT | Performed by: DIETITIAN, REGISTERED

## 2021-05-13 ASSESSMENT — FIBROSIS 4 INDEX: FIB4 SCORE: 1.27

## 2021-05-13 NOTE — PROGRESS NOTES
"Nutrition Reassess: Medical Weight Management   Today's date: 5/13/2021  Referring Provider: No ref. provider found      Time: in/out 4:03-4:30  Visit#: 3    Subjective:  - has been on VLCD x2 months  - inc some veggies  - past complaint of constipation on VLCD   - introduced NS veggies    - going to [facility] mid June for 10 days  - will inc 1 whole food meal a day    Anthropometrics/Objective  Vitals 5/13/2021   WEIGHT 230.2   HEIGHT 5' 5.5\"   BMI 37.72 kg/m2       Starting weight/date 254.7 lbs           Last weight: 239.1 lbs (4/13/21)     Total weight change : -25 lbs                                                                     Meal Plan Recommendation   VLCD with 4 ND MR's per day, some NS veggie snacks    ReAssesment/Notes:    Mandi will continue to work on her overall health goals inclusive of weight loss. She has been doing VLCD, would like to continue with this program for now. Mandi is pleased with her progress so far. She saw the 5lbs of fat visual and was surprised to see the visual when put into relation to her 25lb weight loss.   She will be going on vacation to [facility] in about a month. Mandi will incorporate 1 whole food meal a day while there. We discussed the plate, following this method while there, enjoying her time while. We discussed that her goals can shift to help adapt and she will focus more so on weight maintenance rather than loss at that time. She states she will be taking her walking shoes! Applauded her for her thoughts on how to inc more of a healthful regimen, even when in a different environment.     Follow-up: 6-8 weeks     "

## 2021-05-13 NOTE — PROGRESS NOTES
"Bariatric Medicine Follow Up  Chief Complaint   Patient presents with   • Weight Gain       History of Present Illness:   Mandi Reyes is a 50 y.o. female who presents for follow-up to intensive behavioral modification of overweight and to help address below co-morbidities caused by overweight.      Weight Management History to Date:  4/13/2021 visit #3:  Doing very well on VLCD  Occasional lean green meal in place of 1 ND MR  Walking 40 minutes daily      3/29/2021 visit #2:  VLCD x4  Walking 40 minutes daily  Considering discontinue HCTZ        3/15/2021 visit #1:  Weight loss to prevent breast cancer recurrence  VLCD x4  ___    Challenges since last visit:  Friends passed away since last visit  Dietary adherence:  Great  VLCD x 4  Stress eating for about three wks but quickly resumed VLCD  Exercise:  Walking 40 min daily    AntiObesity Medication use: none  Medication efficacy/tolerance/side effects: n/a  Medication compliance: n/a    Status of comorbid conditions/other medical diagnoses:  IFG: Controlled, no longer on metformin, A1c normal on last labs  History of endometrial cancer/BRCA1 positive: Reducing risk with weight loss  HLD: LDL mildly elevated on last labs, not on statin or fenofibrate  VDD: Controlled with high-dose weekly vitamin D  Chronic insomnia: Controlled       Physical Exam:    /78 (BP Location: Left arm, Patient Position: Sitting, BP Cuff Size: Large adult)   Pulse 74   Ht 1.664 m (5' 5.5\")   Wt 104 kg (230 lb 3.2 oz)   SpO2 94%   BMI 37.72 kg/m²      Weight change since last visit:  -9 lb (-24.5 lb total)  Waist Circum change since last visit:  -1 in (-2 in total)    Physical findings unchanged from prior exam.    Laboratory:   Recent labs reviewed.     ASSESSMENT  50 y.o.  female presents for intensive lifestyle intervention for treatment of obesity and co-morbid conditions.  Obesity Stage ([place])/Class: 3/3    1. Impaired fasting glucose     2. History of " endometrial cancer     3. Hypercholesterolemia     4. Vitamin D deficiency     5. Binge eating     6. Sleeping difficulty     7. Obesity, Class III, BMI 40-49.9 (morbid obesity) ([facility])     8. Weight gain         Patient continues to do very well with VLCD.  Weight gain under control, binge eating improved.  Continue modified VLCD, would not make change back to more whole food quickly.  Continue to monitor fasting glucose, lipids, vitamin D, sleep.  Goal for history of endometrial cancer/BRCA1 positive is lower fat mass percent.    PLAN  Weight Goal: Under 200 lb  Fat mass <30%  Dietary intervention:    MR:  VLCD x 4 most days  Occasional lean/green meal  After next visit, change to 3 ND MR daily + 1 lean/green meal daily  64+ oz water per day  Take meal replacement products with her on her travels  Physical Activity:  Walking 40 min daily  Labs:  n/a  Rx changes:   Wants to consider phentermine   Behavior modification:   Maintain consistency during travel  Surgical considerations:  none  Follow-up: one month with MD  See also recent RD notes and follow-up.      Patient's body mass index is 37.72 kg/m². Exercise and nutrition counseling were performed at this visit.

## 2021-06-23 ENCOUNTER — APPOINTMENT (OUTPATIENT)
Dept: HEALTH INFORMATION MANAGEMENT | Facility: MEDICAL CENTER | Age: 51
End: 2021-06-23
Payer: COMMERCIAL

## 2021-07-19 ENCOUNTER — TELEPHONE (OUTPATIENT)
Dept: ENDOCRINOLOGY | Facility: MEDICAL CENTER | Age: 51
End: 2021-07-19

## 2021-07-19 NOTE — TELEPHONE ENCOUNTER
Left patient a vm informing the that the reason why her appt was cancelled on 7/16/21 was due to [facility] being closed. Added that [facility] will be sending patient's with more information.

## 2021-07-21 ENCOUNTER — HOSPITAL ENCOUNTER (OUTPATIENT)
Dept: HOSPITAL 8 - CFH | Age: 51
Discharge: HOME | End: 2021-07-21
Attending: SURGERY
Payer: COMMERCIAL

## 2021-07-21 DIAGNOSIS — Z12.31: Primary | ICD-10-CM

## 2021-07-21 PROCEDURE — 77067 SCR MAMMO BI INCL CAD: CPT

## 2021-07-21 PROCEDURE — 77063 BREAST TOMOSYNTHESIS BI: CPT

## 2021-11-12 ENCOUNTER — HOSPITAL ENCOUNTER (OUTPATIENT)
Dept: RADIOLOGY | Facility: MEDICAL CENTER | Age: 51
End: 2021-11-12
Attending: SPECIALIST
Payer: COMMERCIAL

## 2021-12-11 ENCOUNTER — HOSPITAL ENCOUNTER (OUTPATIENT)
Dept: RADIOLOGY | Facility: MEDICAL CENTER | Age: 51
End: 2021-12-11
Attending: SPECIALIST
Payer: COMMERCIAL

## 2023-02-02 ENCOUNTER — HOSPITAL ENCOUNTER (OUTPATIENT)
Dept: RADIOLOGY | Facility: MEDICAL CENTER | Age: 53
End: 2023-02-02
Attending: SPECIALIST
Payer: COMMERCIAL

## 2023-10-21 ENCOUNTER — HOSPITAL ENCOUNTER (OUTPATIENT)
Dept: LAB | Facility: MEDICAL CENTER | Age: 53
End: 2023-10-21
Attending: INTERNAL MEDICINE
Payer: COMMERCIAL

## 2023-10-21 LAB
ALBUMIN SERPL BCP-MCNC: 4.2 G/DL (ref 3.2–4.9)
ALBUMIN/GLOB SERPL: 1.4 G/DL
ALP SERPL-CCNC: 71 U/L (ref 30–99)
ALT SERPL-CCNC: 20 U/L (ref 2–50)
ANION GAP SERPL CALC-SCNC: 8 MMOL/L (ref 7–16)
AST SERPL-CCNC: 21 U/L (ref 12–45)
BILIRUB SERPL-MCNC: 0.5 MG/DL (ref 0.1–1.5)
BUN SERPL-MCNC: 16 MG/DL (ref 8–22)
CALCIUM ALBUM COR SERPL-MCNC: 9.3 MG/DL (ref 8.5–10.5)
CALCIUM SERPL-MCNC: 9.5 MG/DL (ref 8.5–10.5)
CHLORIDE SERPL-SCNC: 103 MMOL/L (ref 96–112)
CHOLEST SERPL-MCNC: 204 MG/DL (ref 100–199)
CO2 SERPL-SCNC: 27 MMOL/L (ref 20–33)
CREAT SERPL-MCNC: 0.7 MG/DL (ref 0.5–1.4)
EST. AVERAGE GLUCOSE BLD GHB EST-MCNC: 108 MG/DL
FASTING STATUS PATIENT QL REPORTED: NORMAL
GFR SERPLBLD CREATININE-BSD FMLA CKD-EPI: 103 ML/MIN/1.73 M 2
GLOBULIN SER CALC-MCNC: 3 G/DL (ref 1.9–3.5)
GLUCOSE SERPL-MCNC: 99 MG/DL (ref 65–99)
HBA1C MFR BLD: 5.4 % (ref 4–5.6)
HDLC SERPL-MCNC: 56 MG/DL
LDLC SERPL CALC-MCNC: 122 MG/DL
PLATELET # BLD AUTO: 185 K/UL (ref 164–446)
POTASSIUM SERPL-SCNC: 4 MMOL/L (ref 3.6–5.5)
PROT SERPL-MCNC: 7.2 G/DL (ref 6–8.2)
SODIUM SERPL-SCNC: 138 MMOL/L (ref 135–145)
TRIGL SERPL-MCNC: 132 MG/DL (ref 0–149)
TSH SERPL DL<=0.005 MIU/L-ACNC: 1.19 UIU/ML (ref 0.38–5.33)

## 2023-10-21 PROCEDURE — 83036 HEMOGLOBIN GLYCOSYLATED A1C: CPT

## 2023-10-21 PROCEDURE — 80061 LIPID PANEL: CPT

## 2023-10-21 PROCEDURE — 84443 ASSAY THYROID STIM HORMONE: CPT

## 2023-10-21 PROCEDURE — 36415 COLL VENOUS BLD VENIPUNCTURE: CPT

## 2023-10-21 PROCEDURE — 85049 AUTOMATED PLATELET COUNT: CPT

## 2023-10-21 PROCEDURE — 80053 COMPREHEN METABOLIC PANEL: CPT
